# Patient Record
Sex: FEMALE | Race: WHITE | Employment: FULL TIME | URBAN - METROPOLITAN AREA
[De-identification: names, ages, dates, MRNs, and addresses within clinical notes are randomized per-mention and may not be internally consistent; named-entity substitution may affect disease eponyms.]

---

## 2017-01-24 ENCOUNTER — HOSPITAL ENCOUNTER (OUTPATIENT)
Dept: NUTRITION | Age: 27
Discharge: HOME | End: 2017-01-24
Payer: COMMERCIAL

## 2017-01-24 PROCEDURE — 97803 MED NUTRITION INDIV SUBSEQ: CPT

## 2017-01-24 NOTE — PROGRESS NOTES
NUTRITION  DAILY TREATMENT NOTE  Patient Name: Maki Vang         Date: 2017  : 1990    YES Patient  Verified  Insurance: Payor: Oliver Yang / Plan: Jaylin Palacio / Product Type: HMO /    Diagnosis:  Obesity     In time:   3pm             Out time:   3:30pm   Total Treatment Time (min):   30     SUBJECTIVE    Medication Changes/New allergies or changes in medical history, any new surgeries or procedures? NO    If yes, update Summary List   Subjective Functional Status/Changes:  []  No changes reported   Pt has tried implementing previous nutrition recommendations and quit drinking soda 3 weeks ago. She used to drink 32 oz of soda 5 days per week, which was contributing a lot of calories to her diet. She has also started walking her sister's dog this past week, 12 times for 15 minutes each (sometimes twice a day), but does not believe this is sustainable. Therefore, pt has decided to create her own walking goal (below). She has attempted to make her meals more balanced by reducing the amount of carbohydrates and filling up more on non-starchy vegetables with adequate protein sources (chicken breast, fish, shrimp and beef). She snacks on caramel flavored yogurt as a treat for after dinner (compared to consuming a whole pan of brownies like before). She also snacks on LF cheese sticks, yogurt, and fruit. She will sometimes skip lunch because she is too busy, but most days she is trying to make time for a mid-day meal.   Current Wt: 194.8# Previous Wt: 196.6# Wt Change: -1.8#     OBJECTIVE    Patient Education:  [x]  Review current plan with patient   []  Other: Provided handout on list of non-starchy vegetables. Encouraged pt to continue trying to eat well balanced meals throughout the day to avoid over eating at night. Provided some sweet, healthy snack ideas for after dinner. Encouraged pt to continue to be active and avoid soda.  Discussed limiting carb amount to less than 45g per meal and less than 20g per snack. Handouts/  Information Provided: []  Carbohydrates  []  Protein  []  Fiber  []  Serving Sizes  []  Fluids  []  General guidelines []  Diabetes  []  Cholesterol  []  Sodium  []  SBGM  []  Food Journals  []  Others:    Estimated Needs: 1500 113 150 50    Calories Protein CHO Fat     ASSESSMENT    []  Pt Progressing Well [x]  Slow Progress []  No Progress    Other:    Understand Dietary       Changes/Recommendations []  No Change [x]  Improving []  N/A   Weight []  No Change [x]  Improving []  N/A   Glucose Levels []  No Change []  Improving []  N/A   Cholesterol Levels []  No Change []  Improving []  N/A     RECOMMENDATIONS    - Pt will combine carbohydrates with a protein source at every meal and snack.  - Pt will follow the healthy plate method to ensure meals are balanced and eat 3 meals per day with one snack. Avoid skipping meals.  - Pt will continue to avoid soda. - Pt will walk at least 15 minutes, 3 days per week.      PLAN    [x]  Continue on current plan []  Follow-up PRN   []  Discharge due to :    [x]  Next Appt[de-identified] Tue, 2/28/17 @9am     Dietitian: Gonzales Omer RD    Date: 1/24/2017 Time: 4:02 PM